# Patient Record
Sex: MALE | Race: WHITE | NOT HISPANIC OR LATINO | Employment: FULL TIME | URBAN - METROPOLITAN AREA
[De-identification: names, ages, dates, MRNs, and addresses within clinical notes are randomized per-mention and may not be internally consistent; named-entity substitution may affect disease eponyms.]

---

## 2023-07-03 ENCOUNTER — APPOINTMENT (OUTPATIENT)
Dept: PRIMARY CARE | Facility: CLINIC | Age: 53
End: 2023-07-03
Payer: COMMERCIAL

## 2023-07-27 RX ORDER — CETIRIZINE HYDROCHLORIDE 10 MG/1
10 TABLET ORAL DAILY
Qty: 90 TABLET | Refills: 1 | OUTPATIENT
Start: 2023-07-27

## 2023-07-27 RX ORDER — CETIRIZINE HYDROCHLORIDE 10 MG/1
10 TABLET ORAL DAILY
COMMUNITY
End: 2023-12-19 | Stop reason: ALTCHOICE

## 2023-07-27 NOTE — TELEPHONE ENCOUNTER
Pharmacy Request  Pt canceled 7/3/23 appt. Pt needs appt for refills, please schedule and send back for short supply. Thanks, AM

## 2023-12-19 ENCOUNTER — TELEPHONE (OUTPATIENT)
Dept: PRIMARY CARE | Facility: CLINIC | Age: 53
End: 2023-12-19

## 2023-12-19 ENCOUNTER — OFFICE VISIT (OUTPATIENT)
Dept: PRIMARY CARE | Facility: CLINIC | Age: 53
End: 2023-12-19
Payer: COMMERCIAL

## 2023-12-19 ENCOUNTER — LAB (OUTPATIENT)
Dept: LAB | Facility: LAB | Age: 53
End: 2023-12-19
Payer: COMMERCIAL

## 2023-12-19 VITALS
SYSTOLIC BLOOD PRESSURE: 132 MMHG | HEART RATE: 84 BPM | WEIGHT: 207 LBS | OXYGEN SATURATION: 97 % | DIASTOLIC BLOOD PRESSURE: 84 MMHG | BODY MASS INDEX: 29.7 KG/M2 | TEMPERATURE: 97.3 F

## 2023-12-19 DIAGNOSIS — F51.01 PRIMARY INSOMNIA: ICD-10-CM

## 2023-12-19 DIAGNOSIS — I10 PRIMARY HYPERTENSION: ICD-10-CM

## 2023-12-19 DIAGNOSIS — Z12.5 SCREENING FOR PROSTATE CANCER: ICD-10-CM

## 2023-12-19 DIAGNOSIS — I10 BENIGN ESSENTIAL HYPERTENSION: ICD-10-CM

## 2023-12-19 DIAGNOSIS — Z12.5 SCREENING FOR PROSTATE CANCER: Primary | ICD-10-CM

## 2023-12-19 PROBLEM — J30.9 ALLERGIC RHINITIS: Status: ACTIVE | Noted: 2023-12-19

## 2023-12-19 PROBLEM — G47.00 INSOMNIA: Status: ACTIVE | Noted: 2022-03-02

## 2023-12-19 PROBLEM — G47.33 OBSTRUCTIVE SLEEP APNEA SYNDROME: Status: ACTIVE | Noted: 2023-12-19

## 2023-12-19 PROBLEM — H10.45 CHRONIC ALLERGIC CONJUNCTIVITIS: Status: ACTIVE | Noted: 2023-12-19

## 2023-12-19 PROBLEM — K21.9 GASTROESOPHAGEAL REFLUX DISEASE: Status: ACTIVE | Noted: 2023-12-19

## 2023-12-19 LAB
ALBUMIN SERPL BCP-MCNC: 4.4 G/DL (ref 3.4–5)
ALP SERPL-CCNC: 44 U/L (ref 33–120)
ALT SERPL W P-5'-P-CCNC: 36 U/L (ref 10–52)
ANION GAP SERPL CALC-SCNC: 12 MMOL/L (ref 10–20)
AST SERPL W P-5'-P-CCNC: 25 U/L (ref 9–39)
BILIRUB SERPL-MCNC: 0.8 MG/DL (ref 0–1.2)
BUN SERPL-MCNC: 11 MG/DL (ref 6–23)
CALCIUM SERPL-MCNC: 9.6 MG/DL (ref 8.6–10.3)
CHLORIDE SERPL-SCNC: 103 MMOL/L (ref 98–107)
CHOLEST SERPL-MCNC: 220 MG/DL (ref 0–199)
CHOLESTEROL/HDL RATIO: 2.6
CO2 SERPL-SCNC: 28 MMOL/L (ref 21–32)
CREAT SERPL-MCNC: 0.93 MG/DL (ref 0.5–1.3)
GFR SERPL CREATININE-BSD FRML MDRD: >90 ML/MIN/1.73M*2
GLUCOSE SERPL-MCNC: 102 MG/DL (ref 74–99)
HDLC SERPL-MCNC: 85.5 MG/DL
LDLC SERPL CALC-MCNC: 121 MG/DL
NON HDL CHOLESTEROL: 135 MG/DL (ref 0–149)
POTASSIUM SERPL-SCNC: 4.7 MMOL/L (ref 3.5–5.3)
PROT SERPL-MCNC: 7.3 G/DL (ref 6.4–8.2)
PSA SERPL-MCNC: 1.95 NG/ML
SODIUM SERPL-SCNC: 138 MMOL/L (ref 136–145)
TRIGL SERPL-MCNC: 69 MG/DL (ref 0–149)
VLDL: 14 MG/DL (ref 0–40)

## 2023-12-19 PROCEDURE — 80053 COMPREHEN METABOLIC PANEL: CPT

## 2023-12-19 PROCEDURE — 36415 COLL VENOUS BLD VENIPUNCTURE: CPT

## 2023-12-19 PROCEDURE — 3075F SYST BP GE 130 - 139MM HG: CPT | Performed by: FAMILY MEDICINE

## 2023-12-19 PROCEDURE — 3079F DIAST BP 80-89 MM HG: CPT | Performed by: FAMILY MEDICINE

## 2023-12-19 PROCEDURE — 80061 LIPID PANEL: CPT

## 2023-12-19 PROCEDURE — 1036F TOBACCO NON-USER: CPT | Performed by: FAMILY MEDICINE

## 2023-12-19 PROCEDURE — 99396 PREV VISIT EST AGE 40-64: CPT | Performed by: FAMILY MEDICINE

## 2023-12-19 PROCEDURE — 84153 ASSAY OF PSA TOTAL: CPT

## 2023-12-19 RX ORDER — LISINOPRIL AND HYDROCHLOROTHIAZIDE 12.5; 2 MG/1; MG/1
1 TABLET ORAL DAILY
Qty: 90 TABLET | Refills: 3 | Status: SHIPPED | OUTPATIENT
Start: 2023-12-19

## 2023-12-19 RX ORDER — TRAZODONE HYDROCHLORIDE 100 MG/1
100 TABLET ORAL NIGHTLY
Qty: 90 TABLET | Refills: 3 | Status: SHIPPED | OUTPATIENT
Start: 2023-12-19

## 2023-12-19 RX ORDER — FAMOTIDINE 20 MG/1
20 TABLET, FILM COATED ORAL 2 TIMES DAILY
COMMUNITY
Start: 2022-03-02 | End: 2023-12-19 | Stop reason: ALTCHOICE

## 2023-12-19 RX ORDER — TRAZODONE HYDROCHLORIDE 100 MG/1
100 TABLET ORAL NIGHTLY
COMMUNITY
End: 2023-12-19 | Stop reason: SDUPTHER

## 2023-12-19 RX ORDER — LISINOPRIL AND HYDROCHLOROTHIAZIDE 12.5; 2 MG/1; MG/1
1 TABLET ORAL DAILY
COMMUNITY
Start: 2022-03-02 | End: 2023-12-19 | Stop reason: SDUPTHER

## 2023-12-19 ASSESSMENT — ENCOUNTER SYMPTOMS: HYPERTENSION: 1

## 2023-12-19 NOTE — PROGRESS NOTES
Subjective   Patient ID: Lizandro Fisher is a 53 y.o. male who presents for Anxiety, GERD, and Hypertension.  Anxiety        GERD    Hypertension  Associated symptoms include anxiety.     1. HTN: BP has been good.    2. insomnia: has been sleeping with trazodone.    3. hoarseness: Had a lesion removed from his vocal chord.  GERD stable    Patient Active Problem List   Diagnosis    Allergic rhinitis    Benign essential hypertension    Chronic allergic conjunctivitis    Gastroesophageal reflux disease    Generalized anxiety disorder    Insomnia    Obstructive sleep apnea syndrome       Social Connections: Not on file       Current Outpatient Medications on File Prior to Visit   Medication Sig Dispense Refill    lisinopriL-hydrochlorothiazide 20-12.5 mg tablet Take 1 tablet by mouth once daily.      traZODone (Desyrel) 100 mg tablet Take 1 tablet (100 mg) by mouth once daily at bedtime.      [DISCONTINUED] cetirizine (ZyrTEC) 10 mg tablet Take 1 tablet (10 mg) by mouth once daily.      [DISCONTINUED] famotidine (Pepcid) 20 mg tablet Take 1 tablet (20 mg) by mouth 2 times a day.       No current facility-administered medications on file prior to visit.        Vitals:    12/19/23 0806   BP: 132/84   Pulse: 84   Temp: 36.3 °C (97.3 °F)   SpO2: 97%     Vitals:    12/19/23 0806   Weight: 93.9 kg (207 lb)       Review of Systems   All other systems reviewed and are negative.      Objective     Physical Exam  Vitals reviewed.   Constitutional:       General: He is not in acute distress.     Appearance: Normal appearance. He is well-developed. He is not diaphoretic.   HENT:      Head: Normocephalic and atraumatic.      Right Ear: Tympanic membrane normal.      Left Ear: Tympanic membrane normal.      Nose: Nose normal.      Mouth/Throat:      Mouth: Mucous membranes are moist.   Eyes:      Pupils: Pupils are equal, round, and reactive to light.   Cardiovascular:      Rate and Rhythm: Normal rate and regular rhythm.       Heart sounds: Normal heart sounds. No murmur heard.     No friction rub. No gallop.   Pulmonary:      Effort: Pulmonary effort is normal.      Breath sounds: Normal breath sounds. No rales.   Abdominal:      General: Bowel sounds are normal.      Palpations: Abdomen is soft.      Tenderness: There is no abdominal tenderness.   Musculoskeletal:      Cervical back: Normal range of motion and neck supple.   Skin:     General: Skin is warm and dry.   Neurological:      Mental Status: He is alert.   Psychiatric:         Mood and Affect: Mood normal.         No visits with results within 2 Month(s) from this visit.   Latest known visit with results is:   Legacy Encounter on 10/24/2022   Component Date Value Ref Range Status    Pathology Report 10/24/2022    Final                    Value:Name MAX DONATO                                                                                                   Accession #: [id number]            Pathologist:                   CURLY COPPOLA DMD.  Date of Procedure:    10/24/2022  Date Received:          10/24/2022  Date Reported           11/7/2022  Submitting Physician:   LETTY MORAES MD  Location:                    [facility]   Copy To/Referring/Attending:  LETTY MORAES MD Other External #                                                                    FINAL DIAGNOSIS  VOCAL CORD, RIGHT, LESION, BIOPSY:   --DETACHED FRAGMENTS OF ORTHOKERATIN SURFACED BY BACTERIAL COLONIES AND  SUPERFICIAL VERRUCIFORM FRAGMENTS OF SQUAMOUS MUCOSA WITH HYPERORTHOKERATOSIS,  HYPERGRANULOSIS, AND AT LEAST HIGH-GRADE DYSPLASIA, SEE NOTE.     NOTE: Due to superficial nature of the biopsy specimen underlying submucosa is  not visualized.  A higher grade process cannot be entirely excluded.    at  The gross and/or microscopic findin                          gs were reviewed in conjunction with  pathology resident, Trina Romero MD.                                                       "                                                                                                                                                                                                                                                                                                                                                                                                                                     Electronically Signed Out By CURLY COPPOLA DMD./KEILY  By the signature on this report, the individual or group listed as making the  Final Interpretation/Diagnosis certifies that they have reviewed this case.  Diagnostic interpretation performed at [place] [address]           Clinical History:  Physician Contact Number: [phone]  Fixative (A): Formalin  Clinical Diagnosis History right vocal co                          rd lesion worsening    Specimens Submitted As:  A: RIGHT VOCAL CORD LESION     Gross Description:  Received in formalin, labeled with the patient's name and hospital number and  \"RT vocal cord lesion\", are multiple fragments of light tan soft tissue  aggregating to 1.3 x 0.4 x 0.2 cm.  The specimen is submitted in toto in one  cassette.  LMP      lmp/10/26/2022               [facility]  Department of Pathology   [address] [place] [address]        CONVERTED FINAL DIAGNOSIS 10/24/2022    Final                    Value:VOCAL CORD, RIGHT, LESION, BIOPSY:   --DETACHED FRAGMENTS OF ORTHOKERATIN SURFACED BY BACTERIAL COLONIES AND  SUPERFICIAL VERRUCIFORM FRAGMENTS OF SQUAMOUS MUCOSA WITH HYPERORTHOKERATOSIS,  HYPERGRANULOSIS, AND AT LEAST HIGH-GRADE DYSPLASIA, SEE NOTE.     NOTE: Due to superficial nature of the biopsy specimen underlying submucosa is  not visualized.  A higher grade process cannot be entirely excluded.    at  The gross and/or microscopic findings were reviewed in conjunction " "with  pathology resident, Trina Romero MD.            CONVERTED CLINICAL DIAGNOSIS-HISTO* 10/24/2022    Final                    Value:Physician Contact Number: [phone]  Fixative (A): Formalin  Clinical Diagnosis History right vocal cord lesion worsening      CONVERTED GROSS DESCRIPTION 10/24/2022    Final                    Value:Received in formalin, labeled with the patient's name and hospital number and  \"RT vocal cord lesion\", are multiple fragments of light tan soft tissue  aggregating to 1.3 x 0.4 x 0.2 cm.  The specimen is submitted in toto in one  cassette.  LMP        CONVERTED FINAL REPORT PDF LINK TO* 10/24/2022 \\copathshare\copath\PDF 2022_[id number]   Final       Assessment/Plan   Problem List Items Addressed This Visit    None  Visit Diagnoses         Codes    Screening for prostate cancer    -  Primary Z12.5    Relevant Orders    Prostate Specific Antigen    Primary hypertension     I10    Relevant Medications    lisinopriL-hydrochlorothiazide 20-12.5 mg tablet    traZODone (Desyrel) 100 mg tablet    Other Relevant Orders    Lipid Panel    Comprehensive Metabolic Panel          Doing well.  Keep losing weight.  Get more exercise.  Refilled meds.  Fu in 12 mo  "

## 2023-12-19 NOTE — TELEPHONE ENCOUNTER
----- Message from Lizandro Muniz MD sent at 12/19/2023 12:56 PM EST -----  BW looks normal except BS is elevated slightly.  He can lose weight and that will  help that.

## 2024-02-13 ENCOUNTER — OFFICE VISIT (OUTPATIENT)
Dept: OTOLARYNGOLOGY | Facility: CLINIC | Age: 54
End: 2024-02-13
Payer: COMMERCIAL

## 2024-02-13 VITALS — BODY MASS INDEX: 30.01 KG/M2 | HEIGHT: 70 IN | TEMPERATURE: 97.3 F | WEIGHT: 209.6 LBS

## 2024-02-13 DIAGNOSIS — R49.0 HOARSENESS OF VOICE: Primary | ICD-10-CM

## 2024-02-13 DIAGNOSIS — H90.3 SENSORINEURAL HEARING LOSS (SNHL) OF BOTH EARS: ICD-10-CM

## 2024-02-13 DIAGNOSIS — J38.3 LESION OF VOCAL CORD: ICD-10-CM

## 2024-02-13 PROCEDURE — 31579 LARYNGOSCOPY TELESCOPIC: CPT | Performed by: OTOLARYNGOLOGY

## 2024-02-13 PROCEDURE — 1036F TOBACCO NON-USER: CPT | Performed by: OTOLARYNGOLOGY

## 2024-02-13 ASSESSMENT — PATIENT HEALTH QUESTIONNAIRE - PHQ9
1. LITTLE INTEREST OR PLEASURE IN DOING THINGS: NOT AT ALL
2. FEELING DOWN, DEPRESSED OR HOPELESS: NOT AT ALL
SUM OF ALL RESPONSES TO PHQ9 QUESTIONS 1 AND 2: 0

## 2024-02-13 NOTE — LETTER
2024     Lizandro Muniz MD  [address]  [facility] [address]  [facility] [address]    Patient: Lizandro Fisher   YOB: 1970   Date of Visit: 2024       Dear Dr. Lizandro Muniz MD:    Thank you for referring Lizandro Fisher to me for evaluation. Below are my notes for this consultation.  If you have questions, please do not hesitate to call me. I look forward to following your patient along with you.       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 He has intermittent hoarseness, but otherwise no new concerns.     Exam:  VOICE: Normal    RESPIRATION: Breathing comfortably, no stridor.    ORAL CAVITY/OROPHARYNX/LIPS: Normal mucous membranes, normal floor of mouth/tongue/OP, no masses or lesions are noted.    SKIN: Neck skin is without scar or injury.    PSYCH: Alert and oriented with appropriate mood and affect.       PROCEDURE NOTE:  Recommended stroboscopy.  Risks, benefits,  and alternatives were explained.   wished to proceed and provides verbal consent.     PROCEDURE:  Flexible laryngoscopy with stroboscopy, CPT 44559     POSTPROCEDURE DIAGNOSIS:    INDICATIONS: Inability to tolerate mirror exam or abnormal findings on mirror, Stroboscopy performed to assess one of the followin. Diagnosis of symptomatic disorder involving the voice, swallow, upper aerodigestive tract, including ALFREDO disorders, or  2. Preoperative evaluation of vocal cord function for individuals undergoing surgery where the RLN or vagus nerves are at risk of injury, or  3. Further  evaluation of abnormalities of the upper aerodigestive tract discovered by another modality, such as CT, MRI, bronchoscopy or EGD    Description of Procedure:    After adequate afrin and lidocaine spray, I advanced the endoscope.  Visualization of the nasopharynx, vallecula, posterior pharyngeal walls, pyriform, epiglottis and post cricoid areas was unremarkable.  The following laryngeal findings were noted:    vocal cord movement was normal  closure was complete   Mucosal wave was symmetric  Compression was increased AP like due to scope effect   lesions were none  the subglottis was widely patent  Pharyngeal wall squeeze was normal    Procedure well tolerated.      Assessment and Plan:  This is a follow up visit for chronic progressive hoarseness with clinical findings of a resolved lesion.     We discussed:  He will follow up in one year for a repeat examination, or sooner if there are new concerns.     The patient's questions were answered.    Scribe Attestation  By signing my name below, I, Em Juares , Scribe attest that this documentation has been prepared under the direction and in the presence of Melvi Shaikh MD.

## 2024-02-13 NOTE — PROGRESS NOTES
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 He has intermittent hoarseness, but otherwise no new concerns.     Exam:  VOICE: Normal    RESPIRATION: Breathing comfortably, no stridor.    ORAL CAVITY/OROPHARYNX/LIPS: Normal mucous membranes, normal floor of mouth/tongue/OP, no masses or lesions are noted.    SKIN: Neck skin is without scar or injury.    PSYCH: Alert and oriented with appropriate mood and affect.       PROCEDURE NOTE:  Recommended stroboscopy.  Risks, benefits,  and alternatives were explained.   wished to proceed and provides verbal consent.     PROCEDURE:  Flexible laryngoscopy with stroboscopy, CPT 50731     POSTPROCEDURE DIAGNOSIS:    INDICATIONS: Inability to tolerate mirror exam or abnormal findings on mirror, Stroboscopy performed to assess one of the followin. Diagnosis of symptomatic disorder involving the voice, swallow, upper aerodigestive tract, including ALFREDO disorders, or  2. Preoperative evaluation of vocal cord function for individuals undergoing surgery where the RLN or vagus nerves are at risk of injury, or  3. Further evaluation of abnormalities of the upper aerodigestive tract discovered by another modality, such as CT, MRI, bronchoscopy or EGD    Description of Procedure:    After adequate afrin and lidocaine spray, I advanced the endoscope.  Visualization of the nasopharynx, vallecula, posterior pharyngeal walls, pyriform, epiglottis and post cricoid areas was unremarkable.  The following laryngeal findings were noted:    vocal cord movement was normal  closure was complete   Mucosal wave was symmetric  Compression was increased AP like due to scope effect   lesions were none  the subglottis was widely  patent  Pharyngeal wall squeeze was normal    Procedure well tolerated.      Assessment and Plan:  This is a follow up visit for chronic progressive hoarseness with clinical findings of a resolved lesion.     We discussed:  He will follow up in one year for a repeat examination, or sooner if there are new concerns.     The patient's questions were answered.    Scribe Attestation  By signing my name below, I Emjorge Juares , Scribe attest that this documentation has been prepared under the direction and in the presence of Melvi Shaikh MD.

## 2024-02-19 DIAGNOSIS — H90.3 SENSORINEURAL HEARING LOSS (SNHL) OF BOTH EARS: Primary | ICD-10-CM

## 2024-03-15 NOTE — PROGRESS NOTES
AUDIOLOGY ADULT AUDIOMETRIC EVALUATION      Name:  Lizandro Fisher   :  1970  Age:  54 y.o.  Date of Evaluation:  3/18/2024    Time: 0437-2989    IMPRESSIONS     Hearing sensitivity within normal limits sloping to moderately-severe sensorineural hearing loss in the right ear, and hearing sensitivity within normal limits sloping to severe sensorineural hearing loss in the left ear.  Word understanding in quiet is excellent in both ears.  Tympanometry indicates normal middle ear pressure and reduced tympanic membrane mobility in both ears.    Today's test results are hearing loss requiring medical/otologic and audiologic follow-up.     Amplification needs: Binaural amplification is recommended due to significant hearing loss in both ears, and as a tinnitus management option. Hearing aids were discussed as a management option for hearing loss pending medical clearance. Patient was informed about Hearing Aid Consultation appointments at  including $150 fee and select availability of flex trial devices.       RECOMMENDATIONS     Continue medical follow up with PCP/ENT as recommended.  Return for audiologic evaluation annually, or sooner should concerns arise.  Return for Flex Trial scheduled for 2024 at 8:30 AM with JAZMIN Motta, CCC-A. Contact insurance company to inquire about where is in network for hearing aids. Can schedule a hearing aid evaluation with [facility] audiology department if desired.  Avoid exposure to loud sounds by moving away from the noise, turning down the volume, or wearing proper hearing protection correctly.  Consider use of good communication strategies. These include but are not limited to the following: get Lizandro's attention before speaking to him, close the distance between Lizandro and who is speaking, limit background noise, allow Lizandro access to visual cues (i.e. facial expressions/mouth movements, pictures, written instructions, etc.). When in  "situations where background noise cannot be avoided, position yourself so that the background noise is to your back, and you communication partner is seated in front of you, ideally with a quiet area or wall behind them.     HISTORY     Reason for visit:  Mr. Fisher is seen today for an initial audiologic evaluation at the request of Melvi Shaikh MD due to contant ringing tinnitus. History obtained from patient report and chart review.     Change in Hearing: yes in the left ear greater than the right ear  Difficult listening environments: Denied  Tinnitus: yes not localized to one ear, but rather in his head. Described as constant (waxes and wanes), bothersome at times, non-pulsatile, and described as ringing sensation.  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  Left Ear: Ear canal clear with identifiable cone of light.    Tympanometry (226 Hz):  Right Ear: Type As, normal middle ear pressure and reduced tympanic membrane compliance.   Left Ear: Type As, normal middle ear pressure and reduced tympanic membrane compliance.     Acoustic Reflexes:   Right Ear: Thresholds present at expected levels for 500-2000 Hz and response absent at 4000 Hz.   Left Ear: Thresholds present at expected " levels for 500-4000 Hz and response absent at 4000 Hz.     Distortion Product Otoacoustic Emissions:  Right Ear: Absent at all frequencies tested 6354-4966 Hz.   Left Ear:  Absent at all frequencies tested 3559-9985 Hz.   Present OAEs suggest normal or near cochlear outer hair cell function for corresponding frequency region(s). Absent OAEs with normal middle ear function can be consistent with some degree of hearing loss.     Test technique:  Pure Tone Audiometry via insert earphones  Reliability:   good    Pure Tone Audiometry:    Right Ear: Hearing sensitivity within normal limits for 125-2000 Hz, sloping to moderately-severe sensorineural hearing loss through 6000 Hz, rising to moderate at 8000 Hz.   Left Ear: Hearing sensitivity within normal limits for 125-2000 Hz, sloping to a severe sensorineural hearing loss centered at 4000 Hz, rising to moderate at 8000 Hz.     Speech Audiometry:   Right Ear:  Speech Reception Threshold (SRT) was obtained at 15 dB HL. This is in good agreement with three frequency Pure Tone Average. Word Recognition scores were excellent (100%) in quiet when words were presented at 85 dB HL. These results are based on [facility] Auditory Test No.6 (NU-6) Ordered by difficulty (N=10) and contralateral masking was presented at 45 dB HL.   Left Ear:  Speech Reception Threshold (SRT) was obtained at 10 dB HL. This is in good agreement with three frequency Pure Tone Average. Word Recognition scores were excellent (100%) in quiet when words were presented at 85 dB HL. These results are based on [facility] Auditory Test No.6 (NU-6) Ordered by difficulty (N=10) and contralateral masking was presented at 45 dB HL.     Comparison of today's results with previous test results: No previous results available.    PATIENT EDUCATION     Discussed results and recommendations with Mr. Fisher. Questions were addressed and the patient was encouraged to contact our department at  [phone] should concerns arise.       Jeanne Nguyen, AUD, CCC-A  Licensed Clinical Audiologist      Degree of   Hearing Sensitivity dB Range   Within Normal Limits (WNL) 0 - 20   Slight 25   Mild 26 - 40   Moderate 41 - 55   Moderately-Severe 56 - 70   Severe 71 - 90   Profound 91 +      KEY  CHL Conductive Hearing Loss   ECV Ear Canal Volume   HA Hearing Aid   NIHL Noise-Induced Hearing Loss   PTA Pure Tone Average   SNHL Sensorineural Hearing Loss   TM Tympanic Membrane   WNL Within Normal Limits

## 2024-03-18 ENCOUNTER — CLINICAL SUPPORT (OUTPATIENT)
Dept: AUDIOLOGY | Facility: CLINIC | Age: 54
End: 2024-03-18
Payer: COMMERCIAL

## 2024-03-18 DIAGNOSIS — H90.3 SENSORINEURAL HEARING LOSS (SNHL) OF BOTH EARS: ICD-10-CM

## 2024-03-18 DIAGNOSIS — H93.13 SUBJECTIVE TINNITUS OF BOTH EARS: Primary | ICD-10-CM

## 2024-03-18 PROCEDURE — 92557 COMPREHENSIVE HEARING TEST: CPT

## 2024-03-18 PROCEDURE — 92567 TYMPANOMETRY: CPT

## 2024-03-18 ASSESSMENT — PAIN - FUNCTIONAL ASSESSMENT: PAIN_FUNCTIONAL_ASSESSMENT: 0-10

## 2024-03-18 ASSESSMENT — PAIN SCALES - GENERAL: PAINLEVEL_OUTOF10: 0 - NO PAIN

## 2024-03-18 NOTE — PATIENT INSTRUCTIONS
IMPRESSIONS     Hearing sensitivity within normal limits sloping to moderately-severe sensorineural hearing loss in the right ear, and hearing sensitivity within normal limits sloping to severe sensorineural hearing loss in the left ear.  Word understanding in quiet is excellent in both ears.  Tympanometry indicates normal middle ear pressure and reduced tympanic membrane mobility in both ears.    Today's test results are hearing loss requiring medical/otologic and audiologic follow-up.     Amplification needs: Binaural amplification is recommended due to significant hearing loss in both ears, and as a tinnitus management option. Hearing aids were discussed as a management option for hearing loss pending medical clearance. Patient was informed about Hearing Aid Consultation appointments at  including $150 fee and select availability of flex trial devices.       RECOMMENDATIONS     Continue medical follow up with PCP/ENT as recommended.  Return for audiologic evaluation annually, or sooner should concerns arise.  Return for Flex Trial scheduled for 4/29/2024 at 8:30 AM with JAZMIN Motta CCC-A. Contact insurance company to inquire about where is in network for hearing aids. Can schedule a hearing aid evaluation with [facility] audiology department if desired.  Avoid exposure to loud sounds by moving away from the noise, turning down the volume, or wearing proper hearing protection correctly.  Consider use of good communication strategies. These include but are not limited to the following: get Lizandro's attention before speaking to him, close the distance between Lizandro and who is speaking, limit background noise, allow Lizandro access to visual cues (i.e. facial expressions/mouth movements, pictures, written instructions, etc.). When in situations where background noise cannot be avoided, position yourself so that the background noise is to your back, and you communication partner is seated in front  of you, ideally with a quiet area or wall behind them.

## 2024-04-29 ENCOUNTER — APPOINTMENT (OUTPATIENT)
Dept: AUDIOLOGY | Facility: CLINIC | Age: 54
End: 2024-04-29
Payer: COMMERCIAL

## 2024-06-10 ENCOUNTER — CLINICAL SUPPORT (OUTPATIENT)
Dept: AUDIOLOGY | Facility: CLINIC | Age: 54
End: 2024-06-10

## 2024-06-10 DIAGNOSIS — H93.13 SUBJECTIVE TINNITUS OF BOTH EARS: ICD-10-CM

## 2024-06-10 DIAGNOSIS — H90.3 SENSORINEURAL HEARING LOSS (SNHL) OF BOTH EARS: Primary | ICD-10-CM

## 2024-06-10 PROCEDURE — 92700 UNLISTED ORL SERVICE/PX: CPT | Mod: AUDSP

## 2024-06-10 NOTE — PROGRESS NOTES
AUDIOLOGY HEARING AID CONSULTATION      Name:  Lizandro Fisher  :  1970  Age:  54 y.o.  Date of Encounter:  6/10/2024     Time: 2007-7634    HISTORY     Mr. Fisher is seen today for a hearing aid consultation. A previous hearing evaluation on 3/18/2024 indicated hearing sensitivity within normal limits for 125-2000 Hz, steeply sloping to moderately-severe sensorineural hearing loss through 6000 Hz, rising to moderate in the right ear and hearing sensitivity within normal limits for 125-2000 Hz, steeply sloping to severe sensorineural hearing loss centered at 4000 Hz, rising to moderate at 8000 Hz in the left ear, with excellent word recognition bilaterally. Lizandro indicates he is interested in hearing aids.     Top 3 listening environments to improve:  Tinnitus relief  Understanding in background noise  Work situations    Most important consideration for hearing aids: Tinnitus relief     IMPRESSIONS AND RECOMMENDATIONS      The hearing test from 3/18/2024 was reviewed with the patient. They are a hearing aid candidate in both ears. The benefits and drawbacks of different hearing aid styles and levels of technology were reviewed. Pricing and policies were reviewed. Basic hearing aid use and parts were discussed.    Today's appointment was spent discussing various amplification options. Discussed different styles of amplification, focusing specifically on -in-the-canal style. Discussed at length the various hearing aid technologies. Hearing aid limitations were discussed at length as well as realistic expectations. The patient was advised in order to receive full benefit of amplification, consistent use during all waking hours is recommended. Hearing aid(s) are not a cure for hearing loss or tinnitus. Following the discussion, the patient decided to complete a flex trial of the devices before moving forward with purchasing.      Loaner/Flex Trial Hearing aids:  Hearing Aid Information Right Left     Phonak Phonak   Model Audéo L90-R Audéo L90-R   SN: [id number]    2M 2M   Dome Medium open Medium open     The patient's insurance was called, and it was confirmed that they do not have a hearing aid insurance benefit.     Patient paid in full $150.00 for Flex trial of hearing aids.     TREATMENT PLAN     Return for Flex Trial Return on 7/15/2024 at 8:30 AM with Jazmin Motta CCC-A.  Return for audiologic assessment in conjunction with otologic care or annually, whichever is sooner. Return sooner if concerns arise.   Contact your audiologist if you have any issues prior to your next appointment to address the issue in a timely manner. Call [phone] and request a call back from Jazmin Motta CCC-A.  Consider use of good communication strategies. These include but are not limited to the following: get Lizandro's attention before speaking to him, close the distance between Lizandro and who is speaking, limit background noise, allow Lizandro access to visual cues (i.e. facial expressions/mouth movements, pictures, written instructions, etc.). When in situations where background noise cannot be avoided, position yourself so that the background noise is to your back, and you communication partner is seated in front of you, ideally with a quiet area or wall behind them.   Avoid exposure to loud sounds by moving away from the noise, turning down the volume, or wearing proper hearing protection correctly.      JAZMIN Motta CCC-A   Licensed Clinical Audiologist

## 2024-06-24 ENCOUNTER — APPOINTMENT (OUTPATIENT)
Dept: DERMATOLOGY | Facility: CLINIC | Age: 54
End: 2024-06-24
Payer: COMMERCIAL

## 2024-07-01 NOTE — PROGRESS NOTES
"AUDIOLOGY HEARING AID FLEX TRIAL RETURN     Name:  Lizandro Fisher   :  1970  Age:  54 y.o.  Date of Evaluation:  7/15/2024    Time: ***    RECOMMENDATIONS     Return for scheduled hearing aid fitting on *** at *** with {Audiologists:55491}.  Return for audiologic assessment in conjunction with otologic care or annually, whichever is sooner. Return sooner if concerns arise.   Continue medical follow-up with PCP/ENT as recommended.  {jPed PEREIRA Recommendations:58295::\"Strive for full-time device use during waking hours, except when activities preclude device safety.\",\"Continue medical follow up with primary care provider and/or Ears Nose and Throat (ENT) provider as recommended.\",\"Avoid exposure to loud sounds by moving away from the noise, turning down the volume, or wearing proper hearing protection correctly.\"}    HISTORY     Mr. Fisher was seen today for return of Flex Trial hearing aids. Patient was unaccompanied. Recall, patient was fit with a Flex trial of Phonak Audeo L90-R hearing aids coupled to 2M receivers with medium open domes. A previous hearing evaluation on 3/18/2024 indicated hearing sensitivity within normal limits for 125-2000 Hz, steeply sloping to moderately-severe sensorineural hearing loss through 6000 Hz, rising to moderate in the right ear and hearing sensitivity within normal limits for 125-2000 Hz, steeply sloping to severe sensorineural hearing loss centered at 4000 Hz, rising to moderate at 8000 Hz in the left ear, with excellent word recognition bilaterally. It was confirmed that Mr. Fisher does not have any hearing aid insurance benefits.     Top 3 listening environments to improve:  Tinnitus relief  Understanding in background noise  Work situations     Most important consideration for hearing aids: Tinnitus relief      ***Today, he reported perceived benefit with the use of the hearing aids. However, he will defer obtaining hearing aids at this time. Cost of the premium " "technology is a factor. Recall, his secondary insurance, ***, is not a Medicare Advantage program, so he was told he did not have any hearing aid benefits through ***. Patient would like to explore whether Medicare will cover the devices.    ***The benefits and drawbacks of different hearing aid styles and levels of technology were reviewed. The patient decided to purchase two rechargeable -in-the-Ear (RITE) style hearing aids in the color ***, with the *** level technology, with ***  and *** domes for the right ear, and ***  and *** domes for the left ear. Pricing and policies were reviewed. Basic hearing aid use and parts were discussed.    Hearing Aid Information Right Left    {aris ha :59794::\"Phonak\"} {aris ha :29314::\"Phonak\"}   Model {Hearing Aid Models:22252} {Hearing Aid Models:39425}   {:34032} {aris ha  length:03198}{aris pereira  power:95837} {aris ha  length:73342}{aris pereira  power:49855}   {EM/Dome:41973::\"Dome\"} {HA Dome Size:81899} {HA Dome:89588} {PEREIRA Dome Size:87967} {HA Dome:74069}     The hearing aids will be ordered from the  and ***payment was completed in full today  ***payment will be completed at time of fitting. A hearing aid fitting is scheduled for *** with {Audiologists:56245}.    The hearing aids were returned in good condition.      Jeanne Nguyen, JAZMIN, CCC-A   Licensed Clinical Audiologist   "

## 2024-07-15 ENCOUNTER — APPOINTMENT (OUTPATIENT)
Dept: AUDIOLOGY | Facility: CLINIC | Age: 54
End: 2024-07-15
Payer: COMMERCIAL

## 2024-07-15 DIAGNOSIS — H93.13 SUBJECTIVE TINNITUS OF BOTH EARS: ICD-10-CM

## 2024-07-15 DIAGNOSIS — H90.3 SENSORINEURAL HEARING LOSS (SNHL) OF BOTH EARS: Primary | ICD-10-CM

## 2024-07-17 ENCOUNTER — OFFICE VISIT (OUTPATIENT)
Dept: PRIMARY CARE | Facility: CLINIC | Age: 54
End: 2024-07-17
Payer: COMMERCIAL

## 2024-07-17 ENCOUNTER — LAB (OUTPATIENT)
Dept: LAB | Facility: LAB | Age: 54
End: 2024-07-17
Payer: COMMERCIAL

## 2024-07-17 VITALS
BODY MASS INDEX: 28 KG/M2 | HEART RATE: 78 BPM | OXYGEN SATURATION: 96 % | HEIGHT: 71 IN | DIASTOLIC BLOOD PRESSURE: 78 MMHG | TEMPERATURE: 98.1 F | WEIGHT: 200 LBS | SYSTOLIC BLOOD PRESSURE: 118 MMHG

## 2024-07-17 DIAGNOSIS — I10 PRIMARY HYPERTENSION: ICD-10-CM

## 2024-07-17 DIAGNOSIS — Z12.5 SCREENING FOR PROSTATE CANCER: ICD-10-CM

## 2024-07-17 DIAGNOSIS — E66.3 OVERWEIGHT (BMI 25.0-29.9): Primary | ICD-10-CM

## 2024-07-17 DIAGNOSIS — Z00.00 ROUTINE ADULT HEALTH MAINTENANCE: ICD-10-CM

## 2024-07-17 LAB
ALBUMIN SERPL BCP-MCNC: 4.8 G/DL (ref 3.4–5)
ALP SERPL-CCNC: 42 U/L (ref 33–120)
ALT SERPL W P-5'-P-CCNC: 37 U/L (ref 10–52)
ANION GAP SERPL CALC-SCNC: 13 MMOL/L (ref 10–20)
AST SERPL W P-5'-P-CCNC: 29 U/L (ref 9–39)
BASOPHILS # BLD AUTO: 0.04 X10*3/UL (ref 0–0.1)
BASOPHILS NFR BLD AUTO: 0.5 %
BILIRUB SERPL-MCNC: 0.8 MG/DL (ref 0–1.2)
BUN SERPL-MCNC: 13 MG/DL (ref 6–23)
CALCIUM SERPL-MCNC: 10.6 MG/DL (ref 8.6–10.3)
CHLORIDE SERPL-SCNC: 99 MMOL/L (ref 98–107)
CHOLEST SERPL-MCNC: 248 MG/DL (ref 0–199)
CHOLESTEROL/HDL RATIO: 2.8
CO2 SERPL-SCNC: 30 MMOL/L (ref 21–32)
CREAT SERPL-MCNC: 1 MG/DL (ref 0.5–1.3)
EGFRCR SERPLBLD CKD-EPI 2021: 88 ML/MIN/1.73M*2
EOSINOPHIL # BLD AUTO: 0.2 X10*3/UL (ref 0–0.7)
EOSINOPHIL NFR BLD AUTO: 2.3 %
ERYTHROCYTE [DISTWIDTH] IN BLOOD BY AUTOMATED COUNT: 11.9 % (ref 11.5–14.5)
GLUCOSE SERPL-MCNC: 100 MG/DL (ref 74–99)
HCT VFR BLD AUTO: 47.6 % (ref 41–52)
HDLC SERPL-MCNC: 87.2 MG/DL
HGB BLD-MCNC: 16.3 G/DL (ref 13.5–17.5)
IMM GRANULOCYTES # BLD AUTO: 0.02 X10*3/UL (ref 0–0.7)
IMM GRANULOCYTES NFR BLD AUTO: 0.2 % (ref 0–0.9)
LDLC SERPL CALC-MCNC: 141 MG/DL
LYMPHOCYTES # BLD AUTO: 2.96 X10*3/UL (ref 1.2–4.8)
LYMPHOCYTES NFR BLD AUTO: 33.9 %
MCH RBC QN AUTO: 33.7 PG (ref 26–34)
MCHC RBC AUTO-ENTMCNC: 34.2 G/DL (ref 32–36)
MCV RBC AUTO: 99 FL (ref 80–100)
MONOCYTES # BLD AUTO: 0.78 X10*3/UL (ref 0.1–1)
MONOCYTES NFR BLD AUTO: 8.9 %
NEUTROPHILS # BLD AUTO: 4.72 X10*3/UL (ref 1.2–7.7)
NEUTROPHILS NFR BLD AUTO: 54.2 %
NON HDL CHOLESTEROL: 161 MG/DL (ref 0–149)
NRBC BLD-RTO: 0 /100 WBCS (ref 0–0)
PLATELET # BLD AUTO: 222 X10*3/UL (ref 150–450)
POTASSIUM SERPL-SCNC: 5 MMOL/L (ref 3.5–5.3)
PROT SERPL-MCNC: 7.8 G/DL (ref 6.4–8.2)
PSA SERPL-MCNC: 1.61 NG/ML
RBC # BLD AUTO: 4.83 X10*6/UL (ref 4.5–5.9)
SODIUM SERPL-SCNC: 137 MMOL/L (ref 136–145)
TRIGL SERPL-MCNC: 97 MG/DL (ref 0–149)
VLDL: 19 MG/DL (ref 0–40)
WBC # BLD AUTO: 8.7 X10*3/UL (ref 4.4–11.3)

## 2024-07-17 PROCEDURE — 80053 COMPREHEN METABOLIC PANEL: CPT

## 2024-07-17 PROCEDURE — 3074F SYST BP LT 130 MM HG: CPT | Performed by: FAMILY MEDICINE

## 2024-07-17 PROCEDURE — 36415 COLL VENOUS BLD VENIPUNCTURE: CPT

## 2024-07-17 PROCEDURE — 1036F TOBACCO NON-USER: CPT | Performed by: FAMILY MEDICINE

## 2024-07-17 PROCEDURE — 99396 PREV VISIT EST AGE 40-64: CPT | Performed by: FAMILY MEDICINE

## 2024-07-17 PROCEDURE — 99213 OFFICE O/P EST LOW 20 MIN: CPT | Performed by: FAMILY MEDICINE

## 2024-07-17 PROCEDURE — 84153 ASSAY OF PSA TOTAL: CPT

## 2024-07-17 PROCEDURE — 3008F BODY MASS INDEX DOCD: CPT | Performed by: FAMILY MEDICINE

## 2024-07-17 PROCEDURE — 80061 LIPID PANEL: CPT

## 2024-07-17 PROCEDURE — 85025 COMPLETE CBC W/AUTO DIFF WBC: CPT

## 2024-07-17 PROCEDURE — 3078F DIAST BP <80 MM HG: CPT | Performed by: FAMILY MEDICINE

## 2024-07-17 RX ORDER — LISINOPRIL AND HYDROCHLOROTHIAZIDE 12.5; 2 MG/1; MG/1
1 TABLET ORAL DAILY
Qty: 90 TABLET | Refills: 3 | Status: SHIPPED | OUTPATIENT
Start: 2024-07-17

## 2024-07-17 RX ORDER — TRAZODONE HYDROCHLORIDE 100 MG/1
100 TABLET ORAL NIGHTLY
Qty: 90 TABLET | Refills: 3 | Status: SHIPPED | OUTPATIENT
Start: 2024-07-17

## 2024-07-17 ASSESSMENT — ENCOUNTER SYMPTOMS: HYPERTENSION: 1

## 2024-07-17 NOTE — PROGRESS NOTES
Subjective   Patient ID: Lizandro Fisher is a 54 y.o. male who presents for Annual Exam.  Anxiety        GERD    Hypertension  Associated symptoms include anxiety.     1. HTN: BP has been good.    2. insomnia: has been sleeping with trazodone.    3. hoarseness: Had a lesion removed from his vocal chord.  GERD stable    4. Overweight: still struggling to lose weight.  Trying to eat right.    CPE: no other complaints.  Doing well overall.    Patient Active Problem List   Diagnosis    Allergic rhinitis    Benign essential hypertension    Chronic allergic conjunctivitis    Gastroesophageal reflux disease    Generalized anxiety disorder    Insomnia    Obstructive sleep apnea syndrome    Hoarseness of voice    Lesion of vocal cord       Social Connections: Not on file       Current Outpatient Medications on File Prior to Visit   Medication Sig Dispense Refill    [DISCONTINUED] lisinopriL-hydrochlorothiazide 20-12.5 mg tablet Take 1 tablet by mouth once daily. 90 tablet 3    [DISCONTINUED] traZODone (Desyrel) 100 mg tablet Take 1 tablet (100 mg) by mouth once daily at bedtime. 90 tablet 3     No current facility-administered medications on file prior to visit.        Vitals:    07/17/24 0843   BP: 118/78   Pulse: 78   Temp: 36.7 °C (98.1 °F)   SpO2: 96%     Vitals:    07/17/24 0843   Weight: 90.7 kg (200 lb)       Review of Systems   All other systems reviewed and are negative.      Objective     Physical Exam  Vitals reviewed.   Constitutional:       General: He is not in acute distress.     Appearance: Normal appearance. He is well-developed. He is not diaphoretic.   HENT:      Head: Normocephalic and atraumatic.      Right Ear: Tympanic membrane normal.      Left Ear: Tympanic membrane normal.      Nose: Nose normal.      Mouth/Throat:      Mouth: Mucous membranes are moist.   Eyes:      Pupils: Pupils are equal, round, and reactive to light.   Cardiovascular:      Rate and Rhythm: Normal rate and regular rhythm.       Heart sounds: Normal heart sounds. No murmur heard.     No friction rub. No gallop.   Pulmonary:      Effort: Pulmonary effort is normal.      Breath sounds: Normal breath sounds. No rales.   Abdominal:      General: Bowel sounds are normal.      Palpations: Abdomen is soft.      Tenderness: There is no abdominal tenderness.   Musculoskeletal:      Cervical back: Normal range of motion and neck supple.   Skin:     General: Skin is warm and dry.   Neurological:      Mental Status: He is alert.   Psychiatric:         Mood and Affect: Mood normal.         No visits with results within 2 Month(s) from this visit.   Latest known visit with results is:   Lab on 12/19/2023   Component Date Value Ref Range Status    Cholesterol 12/19/2023 220 (H)  0 - 199 mg/dL Final          Age      Desirable   Borderline High   High     0-19 Y     0 - 169       170 - 199     >/= 200    20-24 Y     0 - 189       190 - 224     >/= 225         >24 Y     0 - 199       200 - 239     >/= 240   **All ranges are based on fasting samples. Specific   therapeutic targets will vary based on patient-specific   cardiac risk.    Pediatric guidelines reference:Pediatrics 2011, 128(S5).Adult guidelines reference: NCEP ATPIII Guidelines,CHRISTINE 2001, 258:2486-97    Venipuncture immediately after or during the administration of Metamizole may lead to falsely low results. Testing should be performed immediately prior to Metamizole dosing.    HDL-Cholesterol 12/19/2023 85.5  mg/dL Final      Age       Very Low   Low     Normal    High    0-19 Y    < 35      < 40     40-45     ----  20-24 Y    ----     < 40      >45      ----        >24 Y      ----     < 40     40-60      >60      Cholesterol/HDL Ratio 12/19/2023 2.6   Final      Ref Values  Desirable  < 3.4  High Risk  > 5.0    LDL Calculated 12/19/2023 121 (H)  <=99 mg/dL Final                                Near   Borderline      AGE      Desirable  Optimal    High     High     Very High     0-19 Y     0 -  109     ---    110-129   >/= 130     ----    20-24 Y     0 - 119     ---    120-159   >/= 160     ----      >24 Y     0 -  99   100-129  130-159   160-189     >/=190      VLDL 12/19/2023 14  0 - 40 mg/dL Final    Triglycerides 12/19/2023 69  0 - 149 mg/dL Final       Age         Desirable   Borderline High   High     Very High   0 D-90 D    19 - 174         ----         ----        ----  91 D- 9 Y     0 -  74        75 -  99     >/= 100      ----    10-19 Y     0 -  89        90 - 129     >/= 130      ----    20-24 Y     0 - 114       115 - 149     >/= 150      ----         >24 Y     0 - 149       150 - 199    200- 499    >/= 500    Venipuncture immediately after or during the administration of Metamizole may lead to falsely low results. Testing should be performed immediately prior to Metamizole dosing.    Non HDL Cholesterol 12/19/2023 135  0 - 149 mg/dL Final          Age       Desirable   Borderline High   High     Very High     0-19 Y     0 - 119       120 - 144     >/= 145    >/= 160    20-24 Y     0 - 149       150 - 189     >/= 190      ----         >24 Y    30 mg/dL above LDL Cholesterol goal      Glucose 12/19/2023 102 (H)  74 - 99 mg/dL Final    Sodium 12/19/2023 138  136 - 145 mmol/L Final    Potassium 12/19/2023 4.7  3.5 - 5.3 mmol/L Final    Chloride 12/19/2023 103  98 - 107 mmol/L Final    Bicarbonate 12/19/2023 28  21 - 32 mmol/L Final    Anion Gap 12/19/2023 12  10 - 20 mmol/L Final    Urea Nitrogen 12/19/2023 11  6 - 23 mg/dL Final    Creatinine 12/19/2023 0.93  0.50 - 1.30 mg/dL Final    eGFR 12/19/2023 >90  >60 mL/min/1.73m*2 Final    Calculations of estimated GFR are performed using the 2021 CKD-EPI Study Refit equation without the race variable for the IDMS-Traceable creatinine methods.  https://jasn.asnjournals.org/content/early/2021/09/22/ASN.1935242350    Calcium 12/19/2023 9.6  8.6 - 10.3 mg/dL Final    Albumin 12/19/2023 4.4  3.4 - 5.0 g/dL Final    Alkaline Phosphatase 12/19/2023 44  33 -  120 U/L Final    Total Protein 12/19/2023 7.3  6.4 - 8.2 g/dL Final    AST 12/19/2023 25  9 - 39 U/L Final    Bilirubin, Total 12/19/2023 0.8  0.0 - 1.2 mg/dL Final    ALT 12/19/2023 36  10 - 52 U/L Final    Patients treated with Sulfasalazine may generate falsely decreased results for ALT.    Prostate Specific AG 12/19/2023 1.95  <=4.00 ng/mL Final       Assessment/Plan   Problem List Items Addressed This Visit    None  Visit Diagnoses         Codes    Overweight (BMI 25.0-29.9)    -  Primary E66.3    Relevant Medications    semaglutide (Rybelsus) 14 mg tablet tablet    Primary hypertension     I10    Relevant Medications    lisinopriL-hydrochlorothiazide 20-12.5 mg tablet    traZODone (Desyrel) 100 mg tablet    Screening for prostate cancer     Z12.5    Relevant Orders    Prostate Specific Antigen    Routine adult health maintenance     Z00.00    Relevant Orders    Lipid Panel    Comprehensive Metabolic Panel    CBC and Auto Differential            Doing well.  Keep losing weight.  Get more exercise.  Refilled meds.  Fu in 12 mo.  Wrote for Rybelsus to consider.

## 2025-02-11 ENCOUNTER — APPOINTMENT (OUTPATIENT)
Dept: OTOLARYNGOLOGY | Facility: CLINIC | Age: 55
End: 2025-02-11
Payer: COMMERCIAL

## 2025-02-11 VITALS — TEMPERATURE: 97.6 F | BODY MASS INDEX: 25.82 KG/M2 | WEIGHT: 182.5 LBS

## 2025-02-11 DIAGNOSIS — G47.9 SLEEP DISTURBANCE: ICD-10-CM

## 2025-02-11 DIAGNOSIS — J34.89 NASAL OBSTRUCTION: ICD-10-CM

## 2025-02-11 DIAGNOSIS — H90.3 SENSORINEURAL HEARING LOSS (SNHL) OF BOTH EARS: ICD-10-CM

## 2025-02-11 DIAGNOSIS — J38.3 LESION OF VOCAL CORD: Primary | ICD-10-CM

## 2025-02-11 DIAGNOSIS — R49.0 HOARSENESS OF VOICE: ICD-10-CM

## 2025-02-11 PROCEDURE — 1036F TOBACCO NON-USER: CPT | Performed by: OTOLARYNGOLOGY

## 2025-02-11 PROCEDURE — 31575 DIAGNOSTIC LARYNGOSCOPY: CPT | Performed by: OTOLARYNGOLOGY

## 2025-02-11 PROCEDURE — 99213 OFFICE O/P EST LOW 20 MIN: CPT | Performed by: OTOLARYNGOLOGY

## 2025-02-11 NOTE — PROGRESS NOTES
Chief Complaint  Chief Complaint   Patient presents with    Follow-up     Reports no new concerns. 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 He occasionally smokes a cigar, no more cigarettes.   No new changes to his medications. He reports a recent 20 lb weight loss - short term use of GLP. He is still snoring at night. No PSG. He is sleeping well and reports being well rested, with trazadone.   He has tried using a hearing aid in the past. No improvement in his  tinnitus. Hearing not improved  He notes intermittent hoarseness, occasional, self-resolves    Exam:  VOICE: mild low pitch glottal salazar vs hoarseness, normal volume, and improved with pitch and breath support  RESPIRATION: Breathing comfortably, no stridor.    ORAL CAVITY/OROPHARYNX/LIPS: Normal mucous membranes, normal floor of mouth/tongue/OP, no masses or lesions are noted. Long low palate. 1+ tonsils. Normal occlusion. Tongue at occlusal plane  NOSE: Septal deflectio to right with mild septal deflection and nasal valve impact R>L.     SKIN: Neck skin is without scar or injury.    PSYCH: Alert and oriented with appropriate mood and affect.       PROCEDURE NOTE:  Recommended stroboscopy.  Risks, benefits,  and alternatives were explained. They wished to proceed and provides verbal consent.     PROCEDURE:  Flexible laryngoscopy, CPT 01327     POSTPROCEDURE DIAGNOSIS: Voice     INDICATIONS: Inability to tolerate mirror exam or abnormal findings on mirror, Stroboscopy performed to assess one of the followin. Diagnosis of symptomatic disorder involving the voice, swallow, upper aerodigestive tract, including ALFREDO disorders, or  2. Preoperative evaluation of vocal cord function for individuals undergoing surgery where  the RLN or vagus nerves are at risk of injury, or  3. Further evaluation of abnormalities of the upper aerodigestive tract discovered by another modality, such as CT, MRI, bronchoscopy or EGD    Description of Procedure:    After adequate afrin and lidocaine spray, I advanced the endoscope.  Visualization of the nasopharynx, vallecula, posterior pharyngeal walls, pyriform, epiglottis and post cricoid areas was unremarkable.  The following laryngeal findings were noted:    Posterior epiglottis that improves with jaw jut   vocal cord movement was normal  closure was complete   Compression was increased AP  lesions were none  the subglottis was widely patent  Pharyngeal wall squeeze was normal    Procedure well tolerated.      Assessment and Plan:  This is a follow up visit for chronic progressive hoarseness with clinical findings of a no acute disease.  Current issues include: Snoring, nasal obstruction.  He was noted to have a long low palate, mild inferior turbinate hypertrophy, and a right-sided septal deflection that could be contributory to his snoring. We discussed consideration of an at home PSG. The patient elected to monitor this, and he will call us for an at home PSG kit.   In the mean time, he will focus on his nasal obstruction with use his Breathe Right strips. If there are no improvements, we discussed consideration of a referral to the rhinology team for further management.   Also discussed use of flonase at night He will obtain OTC  4.  In terms of his voice and stability of his exam over 2 years, he is doing very well. There were no masses or lesions noted on his exam. He will follow up expectantly.   5.    Continue to work on tinnitus strategies, monitor hearing loss.  The patient's questions were answered.    Scribe Attestation  By signing my name below, IEm , Scribe attest that this documentation has been prepared under the direction and in the presence of Melvi Shaikh MD.

## 2025-07-11 ENCOUNTER — OFFICE VISIT (OUTPATIENT)
Dept: PRIMARY CARE | Facility: CLINIC | Age: 55
End: 2025-07-11
Payer: COMMERCIAL

## 2025-07-11 VITALS
TEMPERATURE: 97.5 F | SYSTOLIC BLOOD PRESSURE: 136 MMHG | WEIGHT: 188 LBS | DIASTOLIC BLOOD PRESSURE: 82 MMHG | HEART RATE: 88 BPM | OXYGEN SATURATION: 98 % | BODY MASS INDEX: 26.59 KG/M2

## 2025-07-11 DIAGNOSIS — K64.9 HEMORRHOIDS, UNSPECIFIED HEMORRHOID TYPE: Primary | ICD-10-CM

## 2025-07-11 PROBLEM — J32.0 CHRONIC MAXILLARY SINUSITIS: Status: ACTIVE | Noted: 2022-08-08

## 2025-07-11 PROBLEM — D38.0 NEOPLASM OF UNCERTAIN BEHAVIOR OF LARYNX: Status: ACTIVE | Noted: 2022-08-08

## 2025-07-11 PROCEDURE — 3075F SYST BP GE 130 - 139MM HG: CPT | Performed by: PHYSICIAN ASSISTANT

## 2025-07-11 PROCEDURE — 1036F TOBACCO NON-USER: CPT | Performed by: PHYSICIAN ASSISTANT

## 2025-07-11 PROCEDURE — 3079F DIAST BP 80-89 MM HG: CPT | Performed by: PHYSICIAN ASSISTANT

## 2025-07-11 PROCEDURE — 99214 OFFICE O/P EST MOD 30 MIN: CPT | Performed by: PHYSICIAN ASSISTANT

## 2025-07-11 RX ORDER — HYDROCORTISONE ACETATE 25 MG/1
25 SUPPOSITORY RECTAL 3 TIMES DAILY PRN
Qty: 48 SUPPOSITORY | Refills: 0 | Status: SHIPPED | OUTPATIENT
Start: 2025-07-11 | End: 2026-07-11

## 2025-07-11 ASSESSMENT — ENCOUNTER SYMPTOMS
FEVER: 0
BLOOD IN STOOL: 0
CHILLS: 0
ABDOMINAL PAIN: 0
ANAL BLEEDING: 0

## 2025-07-11 NOTE — PROGRESS NOTES
Subjective   Patient ID: Lizandro Fisher is a 55 y.o. male who presents for Rectal Pain (Possible Hemorrhoid x 1.5 weeks ).    HPI   Patient complains of possible hemorrhoids.    Patient states he feels a small lump in the anal area over the past 1-1/2 weeks.  It is not painful.  No bleeding.  Has been using some Preparation H and it does seem to be getting a little bit smaller.  Has not had problems with hemorrhoids in the past.  No recent constipation or diarrhea.  No abdominal pain.  No recent changes in diet.  Does do weightlifting but that is not new.  Had colonoscopy 1/13/22.      reports current alcohol use of about 5.0 standard drinks of alcohol per week.    Review of Systems   Constitutional:  Negative for chills and fever.   Gastrointestinal:  Negative for abdominal pain, anal bleeding and blood in stool.       Objective   /82   Pulse 88   Temp 36.4 °C (97.5 °F)   Wt 85.3 kg (188 lb)   SpO2 98%   BMI 26.59 kg/m²     Physical Exam  Vitals and nursing note reviewed.   HENT:      Head: Normocephalic.   Eyes:      General: No scleral icterus.  Cardiovascular:      Rate and Rhythm: Normal rate and regular rhythm.   Pulmonary:      Effort: Pulmonary effort is normal.      Breath sounds: Normal breath sounds.   Abdominal:      Palpations: Abdomen is soft. There is no mass.      Tenderness: There is no abdominal tenderness.   Genitourinary:     Comments: There is a small nonthrombosed nontender external hemorrhoid on the anal verge with no active bleeding.  Skin:     General: Skin is warm and dry.   Neurological:      Mental Status: He is alert.   Psychiatric:         Mood and Affect: Affect normal.         Assessment/Plan   Diagnoses and all orders for this visit:  Hemorrhoids, unspecified hemorrhoid type  -     hydrocortisone (Anusol-HC) 25 mg suppository; Insert 1 suppository (25 mg) into the rectum 3 times a day as needed for hemorrhoids.       Discussed nature of hemorrhoids.  Start fiber  supplement to soften stools further.  Hydrate well.  Rx hydrocortisone suppositories to use over the next few weeks.  Can do warm soaks if needed.  Follow-up with PCP Dr. Muniz if symptoms significant increase or persist.

## 2025-08-02 DIAGNOSIS — I10 PRIMARY HYPERTENSION: ICD-10-CM

## 2025-08-04 RX ORDER — TRAZODONE HYDROCHLORIDE 100 MG/1
100 TABLET ORAL NIGHTLY
Qty: 30 TABLET | Refills: 11 | OUTPATIENT
Start: 2025-08-04

## 2025-08-27 DIAGNOSIS — I10 PRIMARY HYPERTENSION: ICD-10-CM

## 2025-08-28 RX ORDER — TRAZODONE HYDROCHLORIDE 100 MG/1
100 TABLET ORAL NIGHTLY
Qty: 30 TABLET | Refills: 0 | Status: SHIPPED | OUTPATIENT
Start: 2025-08-28

## 2025-09-04 ENCOUNTER — APPOINTMENT (OUTPATIENT)
Dept: PRIMARY CARE | Facility: CLINIC | Age: 55
End: 2025-09-04
Payer: COMMERCIAL

## 2025-09-04 ASSESSMENT — PATIENT HEALTH QUESTIONNAIRE - PHQ9
2. FEELING DOWN, DEPRESSED OR HOPELESS: NOT AT ALL
SUM OF ALL RESPONSES TO PHQ9 QUESTIONS 1 AND 2: 0
1. LITTLE INTEREST OR PLEASURE IN DOING THINGS: NOT AT ALL

## 2025-09-04 ASSESSMENT — ENCOUNTER SYMPTOMS: HYPERTENSION: 1
